# Patient Record
Sex: MALE | Race: WHITE | NOT HISPANIC OR LATINO | URBAN - METROPOLITAN AREA
[De-identification: names, ages, dates, MRNs, and addresses within clinical notes are randomized per-mention and may not be internally consistent; named-entity substitution may affect disease eponyms.]

---

## 2017-05-09 ENCOUNTER — COMMUNICATION - HEALTHEAST (OUTPATIENT)
Dept: FAMILY MEDICINE | Facility: CLINIC | Age: 42
End: 2017-05-09

## 2017-05-09 ENCOUNTER — OFFICE VISIT - HEALTHEAST (OUTPATIENT)
Dept: FAMILY MEDICINE | Facility: CLINIC | Age: 42
End: 2017-05-09

## 2017-05-09 ENCOUNTER — COMMUNICATION - HEALTHEAST (OUTPATIENT)
Dept: TELEHEALTH | Facility: CLINIC | Age: 42
End: 2017-05-09

## 2017-05-09 DIAGNOSIS — Z00.00 ANNUAL PHYSICAL EXAM: ICD-10-CM

## 2017-05-09 DIAGNOSIS — K21.9 ACID REFLUX: ICD-10-CM

## 2017-05-09 DIAGNOSIS — Z23 NEED FOR VACCINATION: ICD-10-CM

## 2017-05-09 LAB
CHOLEST SERPL-MCNC: 167 MG/DL
FASTING STATUS PATIENT QL REPORTED: YES
HDLC SERPL-MCNC: 36 MG/DL
LDLC SERPL CALC-MCNC: 106 MG/DL
TRIGL SERPL-MCNC: 125 MG/DL

## 2017-05-09 ASSESSMENT — MIFFLIN-ST. JEOR: SCORE: 1766.25

## 2017-05-10 ENCOUNTER — COMMUNICATION - HEALTHEAST (OUTPATIENT)
Dept: FAMILY MEDICINE | Facility: CLINIC | Age: 42
End: 2017-05-10

## 2019-01-22 ENCOUNTER — OFFICE VISIT - HEALTHEAST (OUTPATIENT)
Dept: FAMILY MEDICINE | Facility: CLINIC | Age: 44
End: 2019-01-22

## 2019-01-22 DIAGNOSIS — Z00.00 ANNUAL PHYSICAL EXAM: ICD-10-CM

## 2019-01-22 DIAGNOSIS — R42 LIGHTHEADEDNESS: ICD-10-CM

## 2019-01-22 DIAGNOSIS — L85.3 DRY SKIN: ICD-10-CM

## 2019-01-22 LAB
ALBUMIN SERPL-MCNC: 4 G/DL (ref 3.5–5)
ALP SERPL-CCNC: 75 U/L (ref 45–120)
ALT SERPL W P-5'-P-CCNC: 21 U/L (ref 0–45)
ANION GAP SERPL CALCULATED.3IONS-SCNC: 5 MMOL/L (ref 5–18)
AST SERPL W P-5'-P-CCNC: 20 U/L (ref 0–40)
ATRIAL RATE - MUSE: 0 BPM
BILIRUB SERPL-MCNC: 0.5 MG/DL (ref 0–1)
BUN SERPL-MCNC: 10 MG/DL (ref 8–22)
CALCIUM SERPL-MCNC: 9.2 MG/DL (ref 8.5–10.5)
CHLORIDE BLD-SCNC: 105 MMOL/L (ref 98–107)
CHOLEST SERPL-MCNC: 189 MG/DL
CO2 SERPL-SCNC: 30 MMOL/L (ref 22–31)
CREAT SERPL-MCNC: 1.16 MG/DL (ref 0.7–1.3)
DIASTOLIC BLOOD PRESSURE - MUSE: NORMAL MMHG
FASTING STATUS PATIENT QL REPORTED: YES
GFR SERPL CREATININE-BSD FRML MDRD: >60 ML/MIN/1.73M2
GLUCOSE BLD-MCNC: 88 MG/DL (ref 70–125)
HDLC SERPL-MCNC: 42 MG/DL
HGB BLD-MCNC: 16.6 G/DL (ref 14–18)
INTERPRETATION ECG - MUSE: NORMAL
LDLC SERPL CALC-MCNC: 118 MG/DL
P AXIS - MUSE: NORMAL DEGREES
POTASSIUM BLD-SCNC: 4.2 MMOL/L (ref 3.5–5)
PR INTERVAL - MUSE: NORMAL MS
PROT SERPL-MCNC: 6.8 G/DL (ref 6–8)
QRS DURATION - MUSE: 70 MS
QT - MUSE: 398 MS
QTC - MUSE: 387 MS
R AXIS - MUSE: 140 DEGREES
SODIUM SERPL-SCNC: 140 MMOL/L (ref 136–145)
SYSTOLIC BLOOD PRESSURE - MUSE: NORMAL MMHG
T AXIS - MUSE: -24 DEGREES
TRIGL SERPL-MCNC: 146 MG/DL
TSH SERPL DL<=0.005 MIU/L-ACNC: 1.62 UIU/ML (ref 0.3–5)
VENTRICULAR RATE- MUSE: 57 BPM

## 2019-01-22 ASSESSMENT — MIFFLIN-ST. JEOR: SCORE: 1766.88

## 2019-01-23 ENCOUNTER — COMMUNICATION - HEALTHEAST (OUTPATIENT)
Dept: FAMILY MEDICINE | Facility: CLINIC | Age: 44
End: 2019-01-23

## 2019-11-27 ENCOUNTER — APPOINTMENT (OUTPATIENT)
Age: 44
Setting detail: DERMATOLOGY
End: 2019-11-27

## 2019-11-27 VITALS — WEIGHT: 190 LBS | RESPIRATION RATE: 16 BRPM | HEIGHT: 68 IN

## 2019-11-27 DIAGNOSIS — Q80.0 ICHTHYOSIS VULGARIS: ICD-10-CM

## 2019-11-27 PROCEDURE — 99202 OFFICE O/P NEW SF 15 MIN: CPT

## 2019-11-27 PROCEDURE — OTHER COUNSELING: OTHER

## 2019-11-27 PROCEDURE — OTHER PRESCRIPTION: OTHER

## 2019-11-27 RX ORDER — TRIAMCINOLONE ACETONIDE 1 MG/G
BID CREAM TOPICAL BID
Qty: 1 | Refills: 1 | Status: ERX | COMMUNITY
Start: 2019-11-27

## 2019-11-27 ASSESSMENT — LOCATION SIMPLE DESCRIPTION DERM
LOCATION SIMPLE: LEFT PRETIBIAL REGION
LOCATION SIMPLE: RIGHT UPPER ARM
LOCATION SIMPLE: LEFT UPPER ARM
LOCATION SIMPLE: LEFT THIGH
LOCATION SIMPLE: LEFT POSTERIOR THIGH
LOCATION SIMPLE: RIGHT PRETIBIAL REGION
LOCATION SIMPLE: RIGHT FOREARM
LOCATION SIMPLE: RIGHT CALF
LOCATION SIMPLE: RIGHT THIGH
LOCATION SIMPLE: LEFT FOREARM
LOCATION SIMPLE: LEFT CALF
LOCATION SIMPLE: RIGHT POSTERIOR THIGH

## 2019-11-27 ASSESSMENT — LOCATION DETAILED DESCRIPTION DERM
LOCATION DETAILED: RIGHT DISTAL LATERAL POSTERIOR THIGH
LOCATION DETAILED: RIGHT ANTERIOR PROXIMAL THIGH
LOCATION DETAILED: LEFT PROXIMAL DORSAL FOREARM
LOCATION DETAILED: LEFT ANTERIOR PROXIMAL THIGH
LOCATION DETAILED: LEFT DISTAL POSTERIOR THIGH
LOCATION DETAILED: LEFT PROXIMAL PRETIBIAL REGION
LOCATION DETAILED: RIGHT DISTAL PRETIBIAL REGION
LOCATION DETAILED: RIGHT DISTAL CALF
LOCATION DETAILED: RIGHT ANTERIOR PROXIMAL UPPER ARM
LOCATION DETAILED: RIGHT PROXIMAL RADIAL DORSAL FOREARM
LOCATION DETAILED: LEFT ANTERIOR DISTAL UPPER ARM
LOCATION DETAILED: LEFT PROXIMAL POSTERIOR UPPER ARM
LOCATION DETAILED: LEFT DISTAL CALF
LOCATION DETAILED: RIGHT PROXIMAL POSTERIOR UPPER ARM

## 2019-11-27 ASSESSMENT — LOCATION ZONE DERM
LOCATION ZONE: ARM
LOCATION ZONE: LEG

## 2019-11-27 NOTE — PROCEDURE: COUNSELING
Patient Specific Counseling (Will Not Stick From Patient To Patient): Recommend moisturizing with Vaseline or aquaphor. If no improvements in 1 month, RTC for re-evaluation.\\n Recommend using triamcinolone three days a week Monday, Wednesday, and Friday, apply to skin once daily to the arms and legs. I counseled him an address side effects of steroid overuse including steroid acne and striae.
Detail Level: Detailed

## 2020-01-27 ENCOUNTER — OFFICE VISIT - HEALTHEAST (OUTPATIENT)
Dept: FAMILY MEDICINE | Facility: CLINIC | Age: 45
End: 2020-01-27

## 2020-01-27 ENCOUNTER — COMMUNICATION - HEALTHEAST (OUTPATIENT)
Dept: SCHEDULING | Facility: CLINIC | Age: 45
End: 2020-01-27

## 2020-01-27 DIAGNOSIS — J01.10 ACUTE NON-RECURRENT FRONTAL SINUSITIS: ICD-10-CM

## 2021-05-26 ENCOUNTER — RECORDS - HEALTHEAST (OUTPATIENT)
Dept: ADMINISTRATIVE | Facility: CLINIC | Age: 46
End: 2021-05-26

## 2021-05-30 ENCOUNTER — RECORDS - HEALTHEAST (OUTPATIENT)
Dept: ADMINISTRATIVE | Facility: CLINIC | Age: 46
End: 2021-05-30

## 2021-05-31 VITALS — HEIGHT: 68 IN | BODY MASS INDEX: 29.92 KG/M2 | WEIGHT: 197.4 LBS

## 2021-06-02 VITALS — HEIGHT: 69 IN | WEIGHT: 197.19 LBS | BODY MASS INDEX: 29.2 KG/M2

## 2021-06-04 VITALS
DIASTOLIC BLOOD PRESSURE: 97 MMHG | SYSTOLIC BLOOD PRESSURE: 159 MMHG | OXYGEN SATURATION: 98 % | HEART RATE: 56 BPM | BODY MASS INDEX: 29.74 KG/M2 | WEIGHT: 198.5 LBS

## 2021-06-05 NOTE — PROGRESS NOTES
Assessment:   Diagnosis: Acute sinusitis     Plan:      1. I recommend taking Augmentin 875 mg tablet by mouth twice a day for 7 days, nasal saline rinsing twice a day, and oral hydration.  2. The risks and benefits of my recommendations, as well as other treatment options were discussed with the patient today.   3. Return for follow-up as needed.     Subjective:       Clay is a 45 y.o. male who I was asked to see in consultation for evaluation of acute sinusitis. The patient reports acute sinus infection for 4 days.  His symptoms include nasal congestion, cough, headaches, facial pain and pressure.  There has not been a history of headaches and facial pain.         Review of Systems  Ears, nose, mouth, throat, and face: positive for nasal congestion       Objective:      BP (!) 159/97   Pulse (!) 56   Wt 198 lb 8 oz (90 kg)   SpO2 98%   BMI 29.74 kg/m      General:   healthy, alert, appears stated age, not in distress   Head and Face:   sinus tenderness was present   External Ears:   normal pinnae shape and position, no signs of inflammation   Ext. Aud. Canal:  Right:patent    Left: patent    Tympanic Mem:  Right: normal landmarks and mobility   Left: normal landmarks and mobility   Nose:  clear discharge, mild congestion, right turbinate red, swollen   Oropharynx:   bifid uvula, lips, dentition and gingiva within normal for age, normal tongue movement   Tonsils:   normal size, normal appearance   Post. Pharynx:   normal mucosa   Neck:   no asymmetry, masses, or scars, supple without significant adenopathy, trachea midline, nontender, mobile lymph nodes < 1.5 cm jugulodigastric region   Thyroid:   Normal       STEFANO Warner-Student 1/27/2020

## 2021-06-05 NOTE — PROGRESS NOTES
Assessment:   1. Acute non-recurrent frontal sinusitis  Discussed diagnosis and treatment plan with patient. Recommend treatment with antibiotic and use of inhaled nasal steroid.   - amoxicillin-clavulanate (AUGMENTIN) 875-125 mg per tablet; Take 1 tablet by mouth 2 (two) times a day for 10 days.  Dispense: 20 tablet; Refill: 0    Addendum: Elevated blood pressure. Called and informed patient to return to the clinic in two weeks for blood pressure recheck and possible treatment.      Plan:   Nasal saline sprays.  Nasal steroids per medication orders.  Antihistamines per medication orders.  Augmentin per medication orders.  Follow up in 7 days or as needed.     Subjective:   Clay Hamilton is a 45 y.o. male who presents for evaluation of sinus pain. Symptoms include: clear rhinorrhea, congestion, facial pain, frequent clearing of the throat, headaches, post nasal drip, sinus pressure and tooth pain. Onset of symptoms was 7 days ago. Symptoms have been unchanged since that time. Past history is significant for no history of pneumonia or bronchitis. Patient is a non-smoker.    The following portions of the patient's history were reviewed and updated as appropriate: allergies, current medications, past family history, past medical history, past social history, past surgical history and problem list.    Review of Systems  A 12 point comprehensive review of systems was negative except as noted.     Objective:      BP (!) 159/97   Pulse (!) 56   Wt 198 lb 8 oz (90 kg)   SpO2 98%   BMI 29.74 kg/m    General appearance: alert, appears stated age and cooperative  Head: Normocephalic, without obvious abnormality, atraumatic, sinuses tender to percussion  Eyes: conjunctivae/corneas clear. PERRL, EOM's intact. Fundi benign.  Ears: normal TM's and external ear canals both ears  Nose: yellow discharge, moderate congestion, turbinates inflamed  Throat: lips, mucosa, and tongue normal; teeth and gums normal  Neck: no adenopathy,  no carotid bruit, no JVD, supple, symmetrical, trachea midline and thyroid not enlarged, symmetric, no tenderness/mass/nodules  Lungs: clear to auscultation bilaterally  Heart: regular rate and rhythm, S1, S2 normal, no murmur, click, rub or gallop  Pulses: 2+ and symmetric  Neurologic: Grossly normal

## 2021-06-05 NOTE — TELEPHONE ENCOUNTER
Triage call:     Sinus symptoms since Thursday  Pain in his Teeth and around his eyes    Symptoms have stayed the same- doesn't feel sick otherwise   Mild to moderate pain   Able to get mucous out- clear at this time   breathing through his nose still but uncomfortable   Irritated throat   Eyes are a little puffy   No fever     Triaged to be seen today or tomorrow- reviewed additional care advice with patient and he verbalizes understanding. Patient warm transferred to scheduling for appointment.  Appointment with PCP @ 10:30 am today.     Jeanna Kraus RN [facility] Triage/Med Refill 1/27/2020 8:10 AM      Reason for Disposition    Patient wants to be seen    Protocols used: SINUS PAIN AND CONGESTION-A-[place]

## 2021-06-10 NOTE — PROGRESS NOTES
Assessment:   1. Need for vaccination  Complete vaccination  - Tdap vaccine,  6yo or older,  IM    2. Annual physical exam  Healthy male exam  - Comprehensive Metabolic Panel  - Lipid Cascade  - Thyroid Stimulating Hormone (TSH)    3. Acid reflux  Nonpharmacologic treatments were discussed including: eating smaller meals, elevation of the head of bed at night, avoidance of caffeine, chocolate, nicotine and peppermint, and avoiding tight fitting clothing.  Will start a trial of H2 antagonists.  Follow up in 4 weeks or sooner as needed.   - ranitidine (ZANTAC) 150 MG tablet; Take 1 tablet (150 mg total) by mouth 2 (two) times a day.  Dispense: 30 tablet; Refill: 3     Plan:   All questions answered.  Blood tests: CBC with diff, Comprehensive metabolic panel and TSH.  Testicular self exam technique reviewed and patient encouraged to perform self-exam monthly.  Discussed healthy lifestyle modifications.  Follow up as needed.     Subjective:   Clay Hamilton is a 42 y.o. male who presents for an annual exam. The patient reports that there NO domestic violence in his life. 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 He would like to be evaluated for a possible acid reflux, patient states that he clears his throat a lot and people around him have gotten to notice that a lot. Hanna has been associated with belching and heartburn. He denies abdominal bloating, bilious reflux, chest pain, cough and difficulty swallowing. Symptoms have been present for a few months. He denies dysphagia. He has not lost weight. He denies melena, hematochezia, hematemesis, and coffee ground emesis. Medical therapy in the past has included: antacids.    Healthy Habits:   Regular Exercise: Yes  Sunscreen Use: Yes  Healthy Diet: No  Dental Visits Regularly: No  Seat Belt: Yes  Sexually active: Yes  Monthly Self Testicular Exams:  No  Hemoccults: N/A  Flex Sig: N/A  Colonoscopy: N/A  Lipid Profile: N/A  Glucose Screen: N/A  Prevention  "of Osteoporosis: N/A  Last Dexa: N/A  Guns at Home:  No      Immunization History   Administered Date(s) Administered     Td, historic 05/26/2000     Tdap 12/16/2008     Immunization status: up to date and documented, stated as current, but no records available.    No exam data present    No current outpatient prescriptions on file.     No current facility-administered medications for this visit.      No past medical history on file.  Past Surgical History:   Procedure Laterality Date     MT APPENDECTOMY      Description: Appendectomy;  Recorded: 07/05/2009;     Review of patient's allergies indicates no known allergies.  No family history on file.  Social History     Social History     Marital status: Single     Spouse name: N/A     Number of children: N/A     Years of education: N/A     Occupational History     Not on file.     Social History Main Topics     Smoking status: Former Smoker     Smokeless tobacco: Not on file     Alcohol use Not on file     Drug use: Not on file     Sexual activity: Not on file     Other Topics Concern     Not on file     Social History Narrative       Review of Systems  General:  Denies problem  Eyes: Denies problem  Ears/Nose/Throat: Denies problem  Cardiovascular: Denies problem  Respiratory:  Denies problem  Gastrointestinal:  Denies problem  Genitourinary: Denies problem  Musculoskeletal:  Denies problem  Skin: Denies problem  Neurologic: Denies problem  Psychiatric: Denies problem  Endocrine: Denies problem  Heme/Lymphatic: Denies problem   Allergic/Immunologic: Denies problem        Objective:     Vitals:    05/09/17 0915   BP: 130/72   Pulse: 61   SpO2: 97%   Weight: 197 lb 6.4 oz (89.5 kg)   Height: 5' 8.4\" (1.737 m)     Body mass index is 29.66 kg/(m^2).    Physical  General Appearance: Alert, cooperative, no distress, appears stated age  Head: Normocephalic, without obvious abnormality, atraumatic  Eyes: PERRL, conjunctiva/corneas clear, EOM's intact  Ears: Normal TM's and " external ear canals, both ears  Nose: Nares normal, septum midline,mucosa normal, no drainage  Throat: Lips, mucosa, and tongue normal; teeth and gums normal  Neck: Supple, symmetrical, trachea midline, no adenopathy;  thyroid: not enlarged, symmetric, no tenderness/mass/nodules; no carotid bruit or JVD  Back: Symmetric, no curvature, ROM normal, no CVA tenderness  Lungs: Clear to auscultation bilaterally, respirations unlabored  Heart: Regular rate and rhythm, S1 and S2 normal, no murmur, rub, or gallop,  Abdomen: Soft, non-tender, bowel sounds active all four quadrants,  no masses, no organomegaly  Genitourinary: Penis normal. Right testis is descended. Left testis is descended.   Musculoskeletal: Normal range of motion. No joint swelling or deformity.   Extremities: Extremities normal, atraumatic, no cyanosis or edema  Skin: Skin color, texture, turgor normal, no rashes or lesions  Lymph nodes: Cervical, supraclavicular, and axillary nodes normal  Neurologic: He is alert. He has normal reflexes.   Psychiatric: He has a normal mood and affect.

## 2021-06-18 NOTE — LETTER
Letter by Jeniffer Rosario FNP at      Author: Jeniffer Rosario FNP Service: -- Author Type: --    Filed:  Encounter Date: 1/23/2019 Status: (Other)       Clay Hamilton  [address]  [place] [address]             January 23, 2019         Dear Mr. Hamilton,    Below are the results from your recent visit:    Resulted Orders   Lipid Cascade   Result Value Ref Range    Cholesterol 189 <=199 mg/dL    Triglycerides 146 <=149 mg/dL    HDL Cholesterol 42 >=40 mg/dL    LDL Calculated 118 <=129 mg/dL    Patient Fasting > 8hrs? Yes    Comprehensive Metabolic Panel   Result Value Ref Range    Sodium 140 136 - 145 mmol/L    Potassium 4.2 3.5 - 5.0 mmol/L    Chloride 105 98 - 107 mmol/L    CO2 30 22 - 31 mmol/L    Anion Gap, Calculation 5 5 - 18 mmol/L    Glucose 88 70 - 125 mg/dL    BUN 10 8 - 22 mg/dL    Creatinine 1.16 0.70 - 1.30 mg/dL    GFR MDRD Af Amer >60 >60 mL/min/1.73m2    GFR MDRD Non Af Amer >60 >60 mL/min/1.73m2    Bilirubin, Total 0.5 0.0 - 1.0 mg/dL    Calcium 9.2 8.5 - 10.5 mg/dL    Protein, Total 6.8 6.0 - 8.0 g/dL    Albumin 4.0 3.5 - 5.0 g/dL    Alkaline Phosphatase 75 45 - 120 U/L    AST 20 0 - 40 U/L    ALT 21 0 - 45 U/L    Narrative    Fasting Glucose reference range is 70-99 mg/dL per  American Diabetes Association (ADA) guidelines.   Hemoglobin   Result Value Ref Range    Hemoglobin 16.6 14.0 - 18.0 g/dL   Thyroid Stimulating Hormone (TSH)   Result Value Ref Range    TSH 1.62 0.30 - 5.00 uIU/mL       Normal lab results. Return to the clinic if symptom persist.     Please call with questions or contact us using IPexpertt.    Sincerely,        Electronically signed by STEFANO Ellsworth

## 2021-06-23 NOTE — PROGRESS NOTES
MALE PREVENTATIVE EXAM    Assessment and Plan:   1. Annual physical exam  Healthy male exam  - Lipid Cascade  - Comprehensive Metabolic Panel  - Hemoglobin    2. Lightheadedness  I suspect near syncope due to vasovagal.  - Hemoglobin  - Thyroid Stimulating Hormone (TSH)  - Electrocardiogram Perform and Read    Next follow up:  No Follow-up on file.    Immunization Review  Adult Imm Review: No immunizations due today    I discussed the following with the patient:   Adult Healthy Living: Importance of regular exercise  Healthy nutrition  Getting adequate sleep  Stress management  Use of seat belts  Distracted driving    I have had an Advance Directives discussion with the patient.    Subjective:   Chief Complaint: Clay Hamilton is an 44 y.o. male here for a preventative health visit.     HPI:  Patient reports that for almost three months he has been experiencing lightheadedness at least once a week. He reports that he started working out three to four times weekly 4 months ago. He reports that lightheadedness happens mostly during mid morning and he works out in the morning. He also reports that he does not eat breakfast but one protein bar. There is no particular position that makes the symptom worse or better. He denied any recent illness or history of brain injury or cardiac disease.     Healthy Habits  Are you taking a daily aspirin? No  Do you typically exercising at least 40 min, 3-4 times per week?  Yes  Do you usually eat at least 4 servings of fruit and vegetables a day, include whole grains and fiber and avoid regularly eating high fat foods? Yes  Have you had an eye exam in the past two years? Yes  Do you see a dentist twice per year? NO  Do you have any concerns regarding sleep? YES    Safety Screen  If you own firearms, are they secured in a locked gun cabinet or with trigger locks? The patient does not own any firearms  Do you feel you are safe where you are living?: Yes (1/22/2019  8:57 AM)  Do you  "feel you are safe in your relationship(s)?: Yes (1/22/2019  8:57 AM)      Review of Systems:  Please see above.  The rest of the review of systems are negative for all systems.     Cancer Screening     Patient has no health maintenance due at this time          Patient Care Team:  Jeniffer Rosario FNP as PCP - General (Nurse Practitioner)        History     Not marked as reviewed during this visit.            Objective:   Vital Signs: There were no vitals taken for this visit.       PHYSICAL EXAM  /78 (Patient Site: Right Arm, Patient Position: Sitting, Cuff Size: Adult Regular)   Pulse 64   Ht 5' 8.5\" (1.74 m)   Wt 197 lb 3 oz (89.4 kg)   SpO2 97%   BMI 29.55 kg/m    General appearance: alert, appears stated age and cooperative  Head: Normocephalic, without obvious abnormality, atraumatic  Eyes: conjunctivae/corneas clear. PERRL, EOM's intact. Fundi benign.  Ears: normal TM's and external ear canals both ears  Throat: lips, mucosa, and tongue normal; teeth and gums normal  Neck: no adenopathy, no carotid bruit, no JVD, supple, symmetrical, trachea midline and thyroid not enlarged, symmetric, no tenderness/mass/nodules  Back: symmetric, no curvature. ROM normal. No CVA tenderness.  Lungs: clear to auscultation bilaterally  Chest wall: no tenderness  Heart: regular rate and rhythm, S1, S2 normal, no murmur, click, rub or gallop  Abdomen: soft, non-tender; bowel sounds normal; no masses,  no organomegaly  Extremities: extremities normal, atraumatic, no cyanosis or edema  Pulses: 2+ and symmetric  Skin: Skin color, texture, turgor normal. No rashes or lesions or papular - generalized, and most on his lower extremities.  and torsol  Lymph nodes: Cervical, supraclavicular, and axillary nodes normal.  Neurologic: Grossly normal      The 10-year ASCVD risk score (James DC Jr., et al., 2013) is: 1.3%    Values used to calculate the score:      Age: 44 years      Sex: Male      Is Non- : " No      Diabetic: No      Tobacco smoker: No      Systolic Blood Pressure: 112 mmHg      Is BP treated: No      HDL Cholesterol: 36 mg/dL      Total Cholesterol: 167 mg/dL         Medication List      as of 1/22/2019  9:45 AM     You have not been prescribed any medications.         Additional Screenings Completed Today:

## 2021-06-26 ENCOUNTER — HEALTH MAINTENANCE LETTER (OUTPATIENT)
Age: 46
End: 2021-06-26

## 2021-10-16 ENCOUNTER — HEALTH MAINTENANCE LETTER (OUTPATIENT)
Age: 46
End: 2021-10-16

## 2022-07-23 ENCOUNTER — HEALTH MAINTENANCE LETTER (OUTPATIENT)
Age: 47
End: 2022-07-23

## 2022-08-16 NOTE — HPI: DRY SKIN
How Severe Is Your Dry Skin?: moderate
How Many Showers Or Baths Do You Take In One Day?: 1 every other day
Graft Donor Site Bandage (Optional-Leave Blank If You Don't Want In Note): Steri-strips and a pressure bandage were applied to the donor site.

## 2022-10-01 ENCOUNTER — HEALTH MAINTENANCE LETTER (OUTPATIENT)
Age: 47
End: 2022-10-01

## 2023-08-06 ENCOUNTER — HEALTH MAINTENANCE LETTER (OUTPATIENT)
Age: 48
End: 2023-08-06

## 2024-12-19 ENCOUNTER — HOSPITAL ENCOUNTER (OUTPATIENT)
Facility: CLINIC | Age: 49
Setting detail: OBSERVATION
Discharge: HOME | End: 2024-12-19
Attending: EMERGENCY MEDICINE
Payer: COMMERCIAL

## 2024-12-19 ENCOUNTER — APPOINTMENT (OUTPATIENT)
Dept: CT IMAGING | Facility: CLINIC | Age: 49
End: 2024-12-19
Attending: EMERGENCY MEDICINE
Payer: COMMERCIAL

## 2024-12-19 VITALS
SYSTOLIC BLOOD PRESSURE: 165 MMHG | HEART RATE: 61 BPM | OXYGEN SATURATION: 97 % | RESPIRATION RATE: 18 BRPM | TEMPERATURE: 97.9 F | DIASTOLIC BLOOD PRESSURE: 92 MMHG | BODY MASS INDEX: 28.49 KG/M2 | HEIGHT: 68 IN | WEIGHT: 188 LBS

## 2024-12-19 DIAGNOSIS — I47.10 SVT (SUPRAVENTRICULAR TACHYCARDIA) (H): ICD-10-CM

## 2024-12-19 LAB
ALBUMIN SERPL BCG-MCNC: 4.7 G/DL (ref 3.5–5.2)
ALP SERPL-CCNC: 88 U/L (ref 40–150)
ALT SERPL W P-5'-P-CCNC: 27 U/L (ref 0–70)
ANION GAP SERPL CALCULATED.3IONS-SCNC: 17 MMOL/L (ref 7–15)
AST SERPL W P-5'-P-CCNC: 38 U/L (ref 0–45)
ATRIAL RATE - MUSE: 153 BPM
ATRIAL RATE - MUSE: 77 BPM
BASOPHILS # BLD AUTO: 0.1 10E3/UL (ref 0–0.2)
BASOPHILS NFR BLD AUTO: 1 %
BILIRUB SERPL-MCNC: 0.7 MG/DL
BUN SERPL-MCNC: 20.7 MG/DL (ref 6–20)
CALCIUM SERPL-MCNC: 10.1 MG/DL (ref 8.8–10.4)
CHLORIDE SERPL-SCNC: 102 MMOL/L (ref 98–107)
CREAT SERPL-MCNC: 1.3 MG/DL (ref 0.67–1.17)
D DIMER PPP FEU-MCNC: 0.51 UG/ML FEU (ref 0–0.5)
DIASTOLIC BLOOD PRESSURE - MUSE: 97 MMHG
DIASTOLIC BLOOD PRESSURE - MUSE: NORMAL MMHG
EGFRCR SERPLBLD CKD-EPI 2021: 68 ML/MIN/1.73M2
EOSINOPHIL # BLD AUTO: 0.3 10E3/UL (ref 0–0.7)
EOSINOPHIL NFR BLD AUTO: 3 %
ERYTHROCYTE [DISTWIDTH] IN BLOOD BY AUTOMATED COUNT: 12.8 % (ref 10–15)
GLUCOSE SERPL-MCNC: 95 MG/DL (ref 70–99)
HCO3 SERPL-SCNC: 20 MMOL/L (ref 22–29)
HCT VFR BLD AUTO: 47.2 % (ref 40–53)
HGB BLD-MCNC: 16.9 G/DL (ref 13.3–17.7)
IMM GRANULOCYTES # BLD: 0 10E3/UL
IMM GRANULOCYTES NFR BLD: 0 %
INTERPRETATION ECG - MUSE: NORMAL
INTERPRETATION ECG - MUSE: NORMAL
LYMPHOCYTES # BLD AUTO: 3.4 10E3/UL (ref 0.8–5.3)
LYMPHOCYTES NFR BLD AUTO: 40 %
MAGNESIUM SERPL-MCNC: 2.1 MG/DL (ref 1.7–2.3)
MCH RBC QN AUTO: 30.3 PG (ref 26.5–33)
MCHC RBC AUTO-ENTMCNC: 35.8 G/DL (ref 31.5–36.5)
MCV RBC AUTO: 85 FL (ref 78–100)
MONOCYTES # BLD AUTO: 0.8 10E3/UL (ref 0–1.3)
MONOCYTES NFR BLD AUTO: 9 %
NEUTROPHILS # BLD AUTO: 3.9 10E3/UL (ref 1.6–8.3)
NEUTROPHILS NFR BLD AUTO: 46 %
NRBC # BLD AUTO: 0 10E3/UL
NRBC BLD AUTO-RTO: 0 /100
P AXIS - MUSE: 51 DEGREES
P AXIS - MUSE: NORMAL DEGREES
PLATELET # BLD AUTO: 219 10E3/UL (ref 150–450)
POTASSIUM SERPL-SCNC: 3.7 MMOL/L (ref 3.4–5.3)
PR INTERVAL - MUSE: 184 MS
PR INTERVAL - MUSE: NORMAL MS
PROT SERPL-MCNC: 7.2 G/DL (ref 6.4–8.3)
QRS DURATION - MUSE: 72 MS
QRS DURATION - MUSE: 78 MS
QT - MUSE: 294 MS
QT - MUSE: 358 MS
QTC - MUSE: 405 MS
QTC - MUSE: 447 MS
R AXIS - MUSE: 78 DEGREES
R AXIS - MUSE: 86 DEGREES
RBC # BLD AUTO: 5.57 10E6/UL (ref 4.4–5.9)
SODIUM SERPL-SCNC: 139 MMOL/L (ref 135–145)
SYSTOLIC BLOOD PRESSURE - MUSE: 162 MMHG
SYSTOLIC BLOOD PRESSURE - MUSE: NORMAL MMHG
T AXIS - MUSE: -34 DEGREES
T AXIS - MUSE: 11 DEGREES
T4 FREE SERPL-MCNC: 1.39 NG/DL (ref 0.9–1.7)
TROPONIN T SERPL HS-MCNC: 21 NG/L
TROPONIN T SERPL HS-MCNC: 28 NG/L
TROPONIN T SERPL HS-MCNC: 9 NG/L
TSH SERPL DL<=0.005 MIU/L-ACNC: 7.54 UIU/ML (ref 0.3–4.2)
VENTRICULAR RATE- MUSE: 139 BPM
VENTRICULAR RATE- MUSE: 77 BPM
WBC # BLD AUTO: 8.4 10E3/UL (ref 4–11)

## 2024-12-19 PROCEDURE — 84484 ASSAY OF TROPONIN QUANT: CPT | Performed by: EMERGENCY MEDICINE

## 2024-12-19 PROCEDURE — 96361 HYDRATE IV INFUSION ADD-ON: CPT

## 2024-12-19 PROCEDURE — 84439 ASSAY OF FREE THYROXINE: CPT | Performed by: EMERGENCY MEDICINE

## 2024-12-19 PROCEDURE — 250N000013 HC RX MED GY IP 250 OP 250 PS 637: Performed by: INTERNAL MEDICINE

## 2024-12-19 PROCEDURE — 85379 FIBRIN DEGRADATION QUANT: CPT | Performed by: EMERGENCY MEDICINE

## 2024-12-19 PROCEDURE — G0378 HOSPITAL OBSERVATION PER HR: HCPCS

## 2024-12-19 PROCEDURE — 71275 CT ANGIOGRAPHY CHEST: CPT

## 2024-12-19 PROCEDURE — 84443 ASSAY THYROID STIM HORMONE: CPT | Performed by: EMERGENCY MEDICINE

## 2024-12-19 PROCEDURE — 83735 ASSAY OF MAGNESIUM: CPT | Performed by: EMERGENCY MEDICINE

## 2024-12-19 PROCEDURE — 99223 1ST HOSP IP/OBS HIGH 75: CPT | Performed by: INTERNAL MEDICINE

## 2024-12-19 PROCEDURE — 85025 COMPLETE CBC W/AUTO DIFF WBC: CPT | Performed by: EMERGENCY MEDICINE

## 2024-12-19 PROCEDURE — 80053 COMPREHEN METABOLIC PANEL: CPT | Performed by: EMERGENCY MEDICINE

## 2024-12-19 PROCEDURE — 36415 COLL VENOUS BLD VENIPUNCTURE: CPT | Performed by: EMERGENCY MEDICINE

## 2024-12-19 PROCEDURE — 93005 ELECTROCARDIOGRAM TRACING: CPT | Performed by: EMERGENCY MEDICINE

## 2024-12-19 PROCEDURE — 258N000003 HC RX IP 258 OP 636: Performed by: EMERGENCY MEDICINE

## 2024-12-19 PROCEDURE — 99285 EMERGENCY DEPT VISIT HI MDM: CPT | Mod: 25

## 2024-12-19 PROCEDURE — 96360 HYDRATION IV INFUSION INIT: CPT

## 2024-12-19 PROCEDURE — 99204 OFFICE O/P NEW MOD 45 MIN: CPT | Performed by: INTERNAL MEDICINE

## 2024-12-19 PROCEDURE — 250N000011 HC RX IP 250 OP 636: Performed by: EMERGENCY MEDICINE

## 2024-12-19 PROCEDURE — 99207 PR NO BILLABLE SERVICE THIS VISIT: CPT | Performed by: INTERNAL MEDICINE

## 2024-12-19 RX ORDER — HYDROCORTISONE 25 MG/G
CREAM TOPICAL DAILY
COMMUNITY

## 2024-12-19 RX ORDER — ALPRAZOLAM 0.5 MG
0.5 TABLET ORAL DAILY PRN
COMMUNITY

## 2024-12-19 RX ORDER — METOPROLOL SUCCINATE 25 MG/1
25 TABLET, EXTENDED RELEASE ORAL DAILY
Qty: 30 TABLET | Refills: 0 | Status: SHIPPED | OUTPATIENT
Start: 2024-12-19 | End: 2025-01-18

## 2024-12-19 RX ORDER — METOPROLOL SUCCINATE 25 MG/1
25 TABLET, EXTENDED RELEASE ORAL DAILY
Status: DISCONTINUED | OUTPATIENT
Start: 2024-12-19 | End: 2024-12-19 | Stop reason: HOSPADM

## 2024-12-19 RX ORDER — IOPAMIDOL 755 MG/ML
75 INJECTION, SOLUTION INTRAVASCULAR ONCE
Status: COMPLETED | OUTPATIENT
Start: 2024-12-19 | End: 2024-12-19

## 2024-12-19 RX ADMIN — IOPAMIDOL 75 ML: 755 INJECTION, SOLUTION INTRAVENOUS at 02:36

## 2024-12-19 RX ADMIN — SODIUM CHLORIDE 1000 ML: 9 INJECTION, SOLUTION INTRAVENOUS at 00:46

## 2024-12-19 RX ADMIN — METOPROLOL SUCCINATE 25 MG: 25 TABLET, EXTENDED RELEASE ORAL at 09:06

## 2024-12-19 ASSESSMENT — COLUMBIA-SUICIDE SEVERITY RATING SCALE - C-SSRS
6. HAVE YOU EVER DONE ANYTHING, STARTED TO DO ANYTHING, OR PREPARED TO DO ANYTHING TO END YOUR LIFE?: NO
2. HAVE YOU ACTUALLY HAD ANY THOUGHTS OF KILLING YOURSELF IN THE PAST MONTH?: NO
1. IN THE PAST MONTH, HAVE YOU WISHED YOU WERE DEAD OR WISHED YOU COULD GO TO SLEEP AND NOT WAKE UP?: NO

## 2024-12-19 ASSESSMENT — ACTIVITIES OF DAILY LIVING (ADL)
ADLS_ACUITY_SCORE: 41

## 2024-12-19 NOTE — CONSULTS
[facility]  [phone]          Assessment/Recommendations   Patient has had several weeks of intermittent racing heartbeats.  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  Episode while driving home from working out where his heart rate would not come down and came to the emergency room.  He reports that on his watch his heart rate was getting up into the 1 60-1 75 range and first EKG in the ER was 139 with SVT.  Had some ST-T wave changes which resolved when he converted to sinus rhythm.    He does drink energy drinks and had a full energy drink yesterday which was unusual for him.  I have advised him to discontinue energy drinks.  He does have borderline high blood pressure in the past and we will start him on Toprol 25 mg a day, schedule a stress echocardiogram, which will allow us to evaluate his heart rate and rhythm with exercise.    Will also schedule him for a evaluation with our electrophysiologist as he may well benefit from SVT ablation.    Okay for patient to discharge today.  Does not need a resting echocardiogram today as he will get a stress echocardiogram in the near future.       History of Present Illness/Subjective    Mr. Clay Hamilton is a 49 year old male with several week history of intermittent feeling his heart racing.  He has related this to panic attacks and sometimes they can occur with exercise or while resting after exercise and have occurred while he is driving a race car.  He drives racecar for an occupation.    The episodes typically come on fairly abruptly and his heart rate can jump up into the 1 60-1 90 range and then will go away rather abruptly.  Yesterday he was working out late into the evening and noticed that his heart rate was not coming down and when he was resting it would come down to 110 and then jump up to 160.  He has an Apple Watch  "and can also feel it.  He relates is feeling to panic attacks.  He tried to rest and closed up his workout place and was in the car and his heart rate just would not come down.  He was in the 160 range.  He was driving by the [employer] so he is decided to pull in.  When he walked into the waiting room he saw along the line that his heart rate was down to 110 so he went back to his car but then the heart rate shot back up and he came into the ER where first EKG showed narrow complex tachycardia at 139 bpm.  The tachycardia resolved shortly after he got to the emergency department and he was kept through the night.  Troponin was mildly elevated on the second check going from 21-28.  The patient had a little bit of shortness of breath with the tachycardia but denies any chest discomfort to me.  He felt a little lightheaded but was not syncopal or near syncopal.  He has had the same episodes which she relates to \"panic attacks\".    He has had a little bit of elevation in his blood pressure and he and his primary care provider of talked about the possibility of blood pressure medicines.  He does use caffeine and usually drinks 1 energy drink in 2 or 3 days but yesterday had a full energy drink which is unusual.  He has an alcoholic beverage in the evening for dinner and onset the weekends, he can go out and have 4-6 drinks but this is not every weekend.  He used to smoke but switched to vaping but now has quit vaping.  His LDL cholesterol in 2019 was 118.  He states that he just had blood work done recently with his primary care provider.  His father has hypertension but he does not have a family history of premature coronary artery disease, is not diabetic, and has not been treated for hypertension.    He denies orthopnea, paroxysmal nocturnal dyspnea, peripheral edema, syncope with the exception of occasionally during medical test or blood draws.  He has passed out during these episodes.  He has no history of " "rheumatic fever, heart murmur, cerebrovascular accident or TIA.    He is  and drives a racecar for an occupation.  This is a stressful time a year as they have a break in the racing season but need to [employer] and plan out their season which ends up being the most stressful part of the year for him.    ECG: Personally reviewed.  First ECG shows SVT at 139 bpm with inferior lateral ST changes.  Second EKG shows sinus rhythm at 77 bpm and the second EKG is normal.    CT of the chest did not show any coronary artery calcifications.       Physical Examination Review of Systems   BP (!) 140/81   Pulse 55   Temp 98.6  F (37  C) (Temporal)   Resp 15   Ht 1.727 m (5' 8\")   Wt 85.3 kg (188 lb)   SpO2 96%   BMI 28.59 kg/m    Body mass index is 28.59 kg/m .  Wt Readings from Last 3 Encounters:   12/19/24 85.3 kg (188 lb)   01/27/20 90 kg (198 lb 8 oz)   01/22/19 89.4 kg (197 lb 3 oz)     General Appearance:   Alert, cooperative and in no acute distress.   ENT/Mouth: Pink/moist oral mucosa   EYES:  no scleral icterus, normal conjunctivae   Neck: JVP normal. No Hepatojugular reflux. Thyroid not visualized.   Chest/Lungs:   Lungs are clear to auscultation, equal chest wall expansion.   Cardiovascular:   S1, S2 without murmur ,clicks or rubs. Brachial, radial and posterior tibial pulses are intact and symetric. No carotid bruits noted   Abdomen:  Nontender. BS+.    Extremities: No cyanosis, clubbing or edema   Skin: no xanthelasma, warm.    Neurologic: normal arm movement bilateral, no tremors     Psychiatric: Appropriate affect.      Encounter Vitals  BP: (!) 140/81  Pulse: 55  Resp: 15  Temp: 98.6  F (37  C)  Temp src: Temporal  SpO2: 96 %  Weight: 85.3 kg (188 lb)  Height: 172.7 cm (5' 8\")                                           Medical History  Surgical History Family History Social History   No past medical history on file. Past Surgical History:   Procedure Laterality Date    APPENDECTOMY      SKIN " GRAFT      ZZC APPENDECTOMY      Description: Appendectomy;  Recorded: 07/05/2009;    Family History   Problem Relation Age of Onset    No Known Problems Mother     No Known Problems Father     No Known Problems Daughter     No Known Problems Son     Social History     Socioeconomic History    Marital status: Single     Spouse name: Not on file    Number of children: Not on file    Years of education: Not on file    Highest education level: Not on file   Occupational History    Not on file   Tobacco Use    Smoking status: Former    Smokeless tobacco: Never   Substance and Sexual Activity    Alcohol use: Yes     Alcohol/week: 3.0 standard drinks of alcohol     Comment: Alcoholic Drinks/day: oc    Drug use: No    Sexual activity: Yes     Partners: Female   Other Topics Concern    Not on file   Social History Narrative    Not on file     Social Drivers of Health     Financial Resource Strain: Not on file   Food Insecurity: Not on file   Transportation Needs: Not on file   Physical Activity: Not on file   Stress: Not on file   Social Connections: Not on file   Interpersonal Safety: Not on file   Housing Stability: Not on file          Medications  Allergies   Current Outpatient Medications   Medication Sig Dispense Refill    ALPRAZolam (XANAX) 0.5 MG tablet Take 0.5 mg by mouth daily as needed for anxiety.      hydrocortisone 2.5 % cream Apply topically daily. To lower extremities after showering      No Known Allergies      Lab Results    Chemistry/lipid CBC Cardiac Enzymes/BNP/TSH/INR   Lab Results   Component Value Date    CHOL 189 01/22/2019    HDL 42 01/22/2019    TRIG 146 01/22/2019    BUN 20.7 (H) 12/19/2024     12/19/2024    CO2 20 (L) 12/19/2024    Lab Results   Component Value Date    WBC 8.4 12/19/2024    HGB 16.9 12/19/2024    HCT 47.2 12/19/2024    MCV 85 12/19/2024     12/19/2024    Lab Results   Component Value Date    TSH 7.54 (H) 12/19/2024

## 2024-12-19 NOTE — H&P
"ADMISSION HISTORY & PHYSICAL      Jeniffer Rosario, [phone]  ASSESSMENT AND PLAN:  49 year old male with history of anxiety who presents with nonsustained SVT.      Nonsustained supraventricular tachycardia  Appreciate cardiology [id number] outpatient for stress testing.  Recommending metoprolol succinate 25 mg daily    Mild troponin elevation  Likely secondary to above  Stress testing outpatient    Disposition:  -Anticipated Length of Stay in midnights and medical necessity (including a midnight in the Emergency Department after triage if applicable): 1 night  -Discharge barriers: Cardiology recommendations    Clinically Significant Risk Factors Present on Admission                             # Overweight: Estimated body mass index is 28.59 kg/m  as calculated from the following:    Height as of this encounter: 1.727 m (5' 8\").    Weight as of this encounter: 85.3 kg (188 lb).                Medically Ready for Discharge: Ready Now        CHIEF COMPLAINT:  Lightheadedness    HISTORY OF PRESENTING ILLNESS:  Patient is a 49 year old male with history significant for anxiety who presented with SVT.  Patient notes that intermittently over the last few days he has been experiencing lightheadedness and shortness of breath and palpitations.  He notes these episodes come on sometimes when he is not doing anything.  Last night he was running and working out.  He noted that his heart rate had recovered but then suddenly was 190 bpm after rest.  He was concerned and presented to the emergency department.  No chest pain.  Complained of lightheadedness and palpitations.  Resolved on its own.    PMH/PSH:  Patient Active Problem List   Diagnosis    Anxiety    Pain During Urination (Dysuria)    SVT (supraventricular tachycardia) (H)       ALLERGIES:  No Known Allergies    MEDICATIONS:  Reviewed.  Current Facility-Administered Medications   Medication Dose Route Frequency Provider Last Rate Last Admin    " "metoprolol succinate ER (TOPROL XL) 24 hr tablet 25 mg  25 mg Oral Daily Reba Cochran, DO         Current Outpatient Medications   Medication Sig Dispense Refill    ALPRAZolam (XANAX) 0.5 MG tablet Take 0.5 mg by mouth daily as needed for anxiety.      hydrocortisone 2.5 % cream Apply topically daily. To lower extremities after showering      metoprolol succinate ER (TOPROL XL) 25 MG 24 hr tablet Take 1 tablet (25 mg) by mouth daily. 30 tablet 0       SOCIAL HISTORY:  Social History     Socioeconomic History    Marital status: Single     Spouse name: Not on file    Number of children: Not on file    Years of education: Not on file    Highest education level: Not on file   Occupational History    Not on file   Tobacco Use    Smoking status: Former    Smokeless tobacco: Never   Substance and Sexual Activity    Alcohol use: Yes     Alcohol/week: 3.0 standard drinks of alcohol     Comment: Alcoholic Drinks/day: oc    Drug use: No    Sexual activity: Yes     Partners: Female   Other Topics Concern    Not on file   Social History Narrative    Not on file     Social Drivers of Health     Financial Resource Strain: Not on file   Food Insecurity: Not on file   Transportation Needs: Not on file   Physical Activity: Not on file   Stress: Not on file   Social Connections: Not on file   Interpersonal Safety: Not on file   Housing Stability: Not on file       FAMILY HISTORY:  Family History   Problem Relation Age of Onset    No Known Problems Mother     No Known Problems Father     No Known Problems Daughter     No Known Problems Son           ROS:  12 point review of systems is reviewed and is negative except for what has already been mention in the history of presenting illness.     PHYSICAL EXAM:  BP (!) 140/81   Pulse 55   Temp 98.6  F (37  C) (Temporal)   Resp 15   Ht 1.727 m (5' 8\")   Wt 85.3 kg (188 lb)   SpO2 96%   BMI 28.59 kg/m    No intake/output data recorded.  No intake/output data " recorded.  GENRL: Alert and answering questions appropriately. Not in acute distress. Lying in bed   CHEST: Breathing easily   HEART: Regular rate   EXTRM: No pedal edema  NEURO: No focal neurological deficit. No involuntary movements. Normal mentation  PSYCH: pleasant affect and mood.   INTGM: No skin rash    Medical Decision Making       75 MINUTES SPENT BY ME on the date of service doing chart review, history, exam, documentation & further activities per the note.        DIAGNOSTIC DATA:  Recent Results (from the past 24 hours)   ECG 12-LEAD WITH MUSE ([facility])    Collection Time: 12/19/24 12:38 AM   Result Value Ref Range    Systolic Blood Pressure  mmHg    Diastolic Blood Pressure  mmHg    Ventricular Rate 139 BPM    Atrial Rate 153 BPM    OK Interval  ms    QRS Duration 72 ms     ms    QTc 447 ms    P Axis  degrees    R AXIS 86 degrees    T Axis -34 degrees    Interpretation ECG       Supraventricular tachycardia  ST & T wave abnormality, consider inferior ischemia  ST & T wave abnormality, consider anterolateral ischemia  Abnormal ECG  When compared with ECG of 22-Jan-2019 09:55,  Vent. rate has increased by  82 bpm  Left posterior fascicular block is no longer Present  Criteria for Inferior infarct are no longer Present  ST now depressed in Anterior leads  T wave inversion now evident in Anterolateral leads  Confirmed by SEE ED PROVIDER NOTE FOR, ECG INTERPRETATION (4000),  JEROME MENDOZA ([id number]) on 12/19/2024 12:42:03 AM     D dimer quantitative    Collection Time: 12/19/24 12:44 AM   Result Value Ref Range    D-Dimer Quantitative 0.51 (H) 0.00 - 0.50 ug/mL FEU   Comprehensive metabolic panel    Collection Time: 12/19/24 12:44 AM   Result Value Ref Range    Sodium 139 135 - 145 mmol/L    Potassium 3.7 3.4 - 5.3 mmol/L    Carbon Dioxide (CO2) 20 (L) 22 - 29 mmol/L    Anion Gap 17 (H) 7 - 15 mmol/L    Urea Nitrogen 20.7 (H) 6.0 - 20.0 mg/dL    Creatinine 1.30 (H) 0.67 - 1.17 mg/dL    GFR Estimate 68  >60 mL/min/1.73m2    Calcium 10.1 8.8 - 10.4 mg/dL    Chloride 102 98 - 107 mmol/L    Glucose 95 70 - 99 mg/dL    Alkaline Phosphatase 88 40 - 150 U/L    AST 38 0 - 45 U/L    ALT 27 0 - 70 U/L    Protein Total 7.2 6.4 - 8.3 g/dL    Albumin 4.7 3.5 - 5.2 g/dL    Bilirubin Total 0.7 <=1.2 mg/dL   Troponin T, High Sensitivity    Collection Time: 12/19/24 12:44 AM   Result Value Ref Range    Troponin T, High Sensitivity 9 <=22 ng/L   TSH with free T4 reflex    Collection Time: 12/19/24 12:44 AM   Result Value Ref Range    TSH 7.54 (H) 0.30 - 4.20 uIU/mL   Magnesium    Collection Time: 12/19/24 12:44 AM   Result Value Ref Range    Magnesium 2.1 1.7 - 2.3 mg/dL   CBC with platelets and differential    Collection Time: 12/19/24 12:44 AM   Result Value Ref Range    WBC Count 8.4 4.0 - 11.0 10e3/uL    RBC Count 5.57 4.40 - 5.90 10e6/uL    Hemoglobin 16.9 13.3 - 17.7 g/dL    Hematocrit 47.2 40.0 - 53.0 %    MCV 85 78 - 100 fL    MCH 30.3 26.5 - 33.0 pg    MCHC 35.8 31.5 - 36.5 g/dL    RDW 12.8 10.0 - 15.0 %    Platelet Count 219 150 - 450 10e3/uL    % Neutrophils 46 %    % Lymphocytes 40 %    % Monocytes 9 %    % Eosinophils 3 %    % Basophils 1 %    % Immature Granulocytes 0 %    NRBCs per 100 WBC 0 <1 /100    Absolute Neutrophils 3.9 1.6 - 8.3 10e3/uL    Absolute Lymphocytes 3.4 0.8 - 5.3 10e3/uL    Absolute Monocytes 0.8 0.0 - 1.3 10e3/uL    Absolute Eosinophils 0.3 0.0 - 0.7 10e3/uL    Absolute Basophils 0.1 0.0 - 0.2 10e3/uL    Absolute Immature Granulocytes 0.0 <=0.4 10e3/uL    Absolute NRBCs 0.0 10e3/uL   T4 free    Collection Time: 12/19/24 12:44 AM   Result Value Ref Range    Free T4 1.39 0.90 - 1.70 ng/dL   ECG 12-LEAD WITH MUSE ([facility])    Collection Time: 12/19/24 12:57 AM   Result Value Ref Range    Systolic Blood Pressure 162 mmHg    Diastolic Blood Pressure 97 mmHg    Ventricular Rate 77 BPM    Atrial Rate 77 BPM    OK Interval 184 ms    QRS Duration 78 ms     ms    QTc 405 ms    P Axis 51 degrees    R AXIS  78 degrees    T Axis 11 degrees    Interpretation ECG       Sinus rhythm  Normal ECG  When compared with ECG of 19-Dec-2024 00:38,  Vent. rate has decreased by  62 bpm  T wave inversion less evident in Inferior leads  T wave inversion no longer evident in Anterolateral leads  Confirmed by SEE ED PROVIDER NOTE FOR, ECG INTERPRETATION (4000),  JEROME MENDOZA ([id number]) on 12/19/2024 1:36:20 AM     Troponin T, High Sensitivity    Collection Time: 12/19/24  2:59 AM   Result Value Ref Range    Troponin T, High Sensitivity 21 <=22 ng/L   Troponin T, High Sensitivity    Collection Time: 12/19/24  4:54 AM   Result Value Ref Range    Troponin T, High Sensitivity 28 (H) <=22 ng/L     All lab studies reviewed personally  Radiology report reviewed.      Reba Cochran DO  Hospitalist Service  [facility]

## 2024-12-19 NOTE — PHARMACY-ADMISSION MEDICATION HISTORY
Pharmacist Admission Medication History    Admission medication history is complete. The information provided in this note is only as accurate as the sources available at the time of the update.    Information Source(s): Patient via in-person    Pertinent Information:       Changes made to PTA medication list:  Added: None  Deleted: None  Changed: None    Allergies reviewed with patient and updates made in EHR: yes    Medication History Completed By: Chris Perry RPH 12/19/2024 7:17 AM    PTA Med List   Medication Sig Last Dose/Taking    ALPRAZolam (XANAX) 0.5 MG tablet Take 0.5 mg by mouth daily as needed for anxiety. Past Month    hydrocortisone 2.5 % cream Apply topically daily. To lower extremities after showering Past Week

## 2024-12-19 NOTE — ED TRIAGE NOTES
Patient arrives to the ER with c/o an elevated heart rate. Patient was out for a run doing sprints. After finishing, he noticed that his heart rate kept going higher, then dropping back down, causing some shortness of breath.      Triage Assessment (Adult)       Row Name 12/19/24 0037          Triage Assessment    Airway WDL WDL        Respiratory WDL    Respiratory WDL X;rhythm/pattern     Rhythm/Pattern, Respiratory shortness of breath        Skin Circulation/Temperature WDL    Skin Circulation/Temperature WDL WDL        Cardiac WDL    Cardiac WDL X  palpitations, elevated heart rate        Peripheral/Neurovascular WDL    Peripheral Neurovascular WDL WDL        Cognitive/Neuro/Behavioral WDL    Cognitive/Neuro/Behavioral WDL WDL

## 2024-12-20 ENCOUNTER — HOSPITAL ENCOUNTER (OUTPATIENT)
Dept: CARDIOLOGY | Facility: HOSPITAL | Age: 49
Discharge: HOME | End: 2024-12-20
Attending: INTERNAL MEDICINE | Admitting: INTERNAL MEDICINE
Payer: COMMERCIAL

## 2024-12-20 DIAGNOSIS — I47.10 SVT (SUPRAVENTRICULAR TACHYCARDIA) (H): ICD-10-CM

## 2024-12-20 LAB
CV STRESS CURRENT BP HE: NORMAL
CV STRESS CURRENT HR HE: 101
CV STRESS CURRENT HR HE: 104
CV STRESS CURRENT HR HE: 108
CV STRESS CURRENT HR HE: 114
CV STRESS CURRENT HR HE: 114
CV STRESS CURRENT HR HE: 124
CV STRESS CURRENT HR HE: 127
CV STRESS CURRENT HR HE: 127
CV STRESS CURRENT HR HE: 140
CV STRESS CURRENT HR HE: 140
CV STRESS CURRENT HR HE: 142
CV STRESS CURRENT HR HE: 57
CV STRESS CURRENT HR HE: 64
CV STRESS CURRENT HR HE: 78
CV STRESS CURRENT HR HE: 78
CV STRESS CURRENT HR HE: 80
CV STRESS CURRENT HR HE: 82
CV STRESS CURRENT HR HE: 82
CV STRESS CURRENT HR HE: 84
CV STRESS CURRENT HR HE: 85
CV STRESS CURRENT HR HE: 86
CV STRESS CURRENT HR HE: 86
CV STRESS CURRENT HR HE: 87
CV STRESS CURRENT HR HE: 88
CV STRESS CURRENT HR HE: 89
CV STRESS CURRENT HR HE: 91
CV STRESS CURRENT HR HE: 93
CV STRESS CURRENT HR HE: 96
CV STRESS CURRENT HR HE: 98
CV STRESS CURRENT HR HE: 99
CV STRESS DEVIATION TIME HE: NORMAL
CV STRESS ECHO PERCENT HR HE: NORMAL
CV STRESS EXERCISE STAGE HE: NORMAL
CV STRESS EXERCISE STAGE REACHED HE: NORMAL
CV STRESS FINAL RESTING BP HE: NORMAL
CV STRESS FINAL RESTING HR HE: 78
CV STRESS MAX HR HE: 145
CV STRESS MAX TREADMILL GRADE HE: 16
CV STRESS MAX TREADMILL SPEED HE: 4.2
CV STRESS PEAK DIA BP HE: NORMAL
CV STRESS PEAK SYS BP HE: NORMAL
CV STRESS PHASE HE: NORMAL
CV STRESS PROTOCOL HE: NORMAL
CV STRESS REASON STOPPED HE: NORMAL
CV STRESS RESTING PT POSITION HE: NORMAL
CV STRESS RESTING PT POSITION HE: NORMAL
CV STRESS ST DEVIATION AMOUNT HE: NORMAL
CV STRESS ST DEVIATION ELEVATION HE: NORMAL
CV STRESS ST EVELATION AMOUNT HE: NORMAL
CV STRESS SYMPTOMS HE: NORMAL
CV STRESS TEST TYPE HE: NORMAL
CV STRESS TOTAL STAGE TIME MIN 1 HE: NORMAL
STRESS ECHO BASELINE DIASTOLIC HE: 88
STRESS ECHO BASELINE HR: 57
STRESS ECHO BASELINE SYSTOLIC BP: 140
STRESS ECHO LAST STRESS DIASTOLIC BP: 62
STRESS ECHO LAST STRESS HR: 140
STRESS ECHO LAST STRESS SYSTOLIC BP: 164
STRESS ECHO POST ESTIMATED WORKLOAD: 12.1
STRESS ECHO POST EXERCISE DUR MIN: 11
STRESS ECHO POST EXERCISE DUR SEC: 59
STRESS ECHO TARGET HR: 147

## 2024-12-20 PROCEDURE — 93325 DOPPLER ECHO COLOR FLOW MAPG: CPT | Mod: TC

## 2024-12-20 PROCEDURE — 93350 STRESS TTE ONLY: CPT | Mod: 26 | Performed by: INTERNAL MEDICINE

## 2024-12-20 PROCEDURE — 93016 CV STRESS TEST SUPVJ ONLY: CPT | Performed by: INTERNAL MEDICINE

## 2024-12-20 PROCEDURE — 93018 CV STRESS TEST I&R ONLY: CPT | Performed by: INTERNAL MEDICINE

## 2024-12-20 PROCEDURE — 93325 DOPPLER ECHO COLOR FLOW MAPG: CPT | Mod: 26 | Performed by: INTERNAL MEDICINE

## 2024-12-20 PROCEDURE — 93321 DOPPLER ECHO F-UP/LMTD STD: CPT | Mod: 26 | Performed by: INTERNAL MEDICINE

## 2024-12-20 PROCEDURE — 93350 STRESS TTE ONLY: CPT | Mod: TC

## 2024-12-23 DIAGNOSIS — I47.10 SVT (SUPRAVENTRICULAR TACHYCARDIA) (H): ICD-10-CM

## 2024-12-23 DIAGNOSIS — R06.09 DOE (DYSPNEA ON EXERTION): Primary | ICD-10-CM

## 2024-12-23 DIAGNOSIS — R07.89 CHEST DISCOMFORT: ICD-10-CM

## 2024-12-23 DIAGNOSIS — R00.2 PALPITATIONS: ICD-10-CM

## 2024-12-26 RX ORDER — METOPROLOL TARTRATE 1 MG/ML
5-20 INJECTION, SOLUTION INTRAVENOUS
Status: ACTIVE | OUTPATIENT
Start: 2024-12-26

## 2024-12-26 RX ORDER — NITROGLYCERIN 0.4 MG/1
0.4 TABLET SUBLINGUAL
Status: ACTIVE | OUTPATIENT
Start: 2024-12-26

## 2025-01-02 ENCOUNTER — OFFICE VISIT (OUTPATIENT)
Dept: CARDIOLOGY | Facility: CLINIC | Age: 50
End: 2025-01-02
Attending: INTERNAL MEDICINE
Payer: COMMERCIAL

## 2025-01-02 VITALS
WEIGHT: 192.4 LBS | HEIGHT: 68 IN | BODY MASS INDEX: 29.16 KG/M2 | DIASTOLIC BLOOD PRESSURE: 80 MMHG | HEART RATE: 55 BPM | SYSTOLIC BLOOD PRESSURE: 116 MMHG | RESPIRATION RATE: 16 BRPM | OXYGEN SATURATION: 96 %

## 2025-01-02 DIAGNOSIS — I47.10 SVT (SUPRAVENTRICULAR TACHYCARDIA) (H): ICD-10-CM

## 2025-01-02 NOTE — PATIENT INSTRUCTIONS
[facility]  Cardiac Electrophysiology  [address]  [place] [address]   Office: [phone]  Fax: [phone]       Thank you for seeing us in clinic today - it is a pleasure to be a part of your care team.  Below is a summary of our plan from today's visit.      We will call and schedule you for an an SVT ablation  Continue metoprolol for now    Please do not hesitate to be in touch with our office at [phone] with any questions that may arise.      Thank you for trusting us with your care,    Carole Shell MD  Clinical Cardiac Electrophysiology  [facility]  [address]  [place] [address]   Office: [phone]  Fax: [phone]

## 2025-01-02 NOTE — LETTER
1/2/2025    [facility] [address]  [facility], [place] [address]    RE: Clay Hamilton       Dear Colleague,     I had the pleasure of seeing Clay Hamilton in the [facility].    HEART CARE ENCOUNTER CONSULTATON NOTE      [facility]  [phone]      Assessment/Recommendations   Assessment/Plan:    Clay Hamilton is a very pleasant 49 year old male with PMH of SVT who presents today to the EP clinic.    SVT  - symptomatic with palpitations.  Likely AVNRT, though AVRT and AT are possible.  We reviewed SVT mechanisms and reviewed treatment options including electrophysiology study and ablation vs continued medical therapy.  We reviewed the nature of EP studies and ablation for SVT, success rates depending on the specific SVT mechanism, procedural risks (including groin hematoma, tamponade, heart block, stroke) and recovery expectations.  - He is on metoprolol and and would like to avoid long term meds      Time spent: 60 minutes spent on the date of the encounter doing chart review, history and exam, documentation and further activities as noted above.    The longitudinal plan of care for the condition(s) below were addressed during this visit. Due to the added complexity in care, I will continue support in the subsequent management of this condition(s) and with the ongoing continuity of care of this condition(s).          History of Present Illness/Subjective    HPI: Clay Hamilton is a very pleasant 49 year old male with PMH of SVT who presents today to the EP clinic.    Clay describes having symptoms of heart racing and palpitations after running a 5k and some sprints which prompted him to go to the ER in December. His HR was in the 140-160bpm range and it resolved spontaneously. He was started on Metoprolol after that. He has not had any episodes since starting metoprolol. However he would like to avoid long term meds.    He does  recall having episodes like this in the past but he attributed to having a panic attack      Recent Echocardiogram Results (personally reviewed):    Interpretation Summary  1. Normal stress echocardiogram without evidence of stress induced ischemia.  2. Normal resting LV systolic performance with an ejection fraction of 55-60%.  There is normal improvement in left ventricular systolic performance with a  peak ejection fraction of 70-75%.  3. No ECG evidence of ischemia.  4. Patient reported chest discomfort with exercise.  5. Good functional capacity for age.  6. Moderately frequent PVCs and 13 couplets were noted.      Labs below reviewed personally     Physical Examination  Review of Systems   Vitals: There were no vitals taken for this visit.  BMI= There is no height or weight on file to calculate BMI.  Wt Readings from Last 3 Encounters:   12/19/24 85.3 kg (188 lb)   01/27/20 90 kg (198 lb 8 oz)   01/22/19 89.4 kg (197 lb 3 oz)       General Appearance:   no distress, normal body habitus   ENT/Mouth: membranes moist, no oral lesions or bleeding gums.      EYES:  no scleral icterus, normal conjunctivae   Neck: no carotid bruits or thyromegaly   Chest/Lungs:   lungs are clear to auscultation, no rales or wheezing, no sternal scar, equal chest wall expansion    Cardiovascular:   Regular. Normal first and second heart sounds with no murmurs, rubs, or gallops; the carotid, radial and posterior tibial pulses are intact, no edema bilaterally    Abdomen:  no organomegaly, masses, bruits, or tenderness; bowel sounds are present   Extremities: no cyanosis or clubbing   Skin: no xanthelasma, warm.    Neurologic: normal  bilateral, no tremors     Psychiatric: alert and oriented x3, calm        Please refer above for cardiac ROS details.        Medical History  Surgical History Family History Social History   No past medical history on file.  Past Surgical History:   Procedure Laterality Date     APPENDECTOMY       SKIN  GRAFT       ZZC APPENDECTOMY      Description: Appendectomy;  Recorded: 07/05/2009;     Family History   Problem Relation Age of Onset     No Known Problems Mother      No Known Problems Father      No Known Problems Daughter      No Known Problems Son         Social History     Socioeconomic History     Marital status: Single     Spouse name: Not on file     Number of children: Not on file     Years of education: Not on file     Highest education level: Not on file   Occupational History     Not on file   Tobacco Use     Smoking status: Former     Smokeless tobacco: Never   Substance and Sexual Activity     Alcohol use: Yes     Alcohol/week: 3.0 standard drinks of alcohol     Comment: Alcoholic Drinks/day: oc     Drug use: No     Sexual activity: Yes     Partners: Female   Other Topics Concern     Not on file   Social History Narrative     Not on file     Social Drivers of Health     Financial Resource Strain: Not on file   Food Insecurity: Not on file   Transportation Needs: Not on file   Physical Activity: Not on file   Stress: Not on file   Social Connections: Not on file   Interpersonal Safety: Not on file   Housing Stability: Not on file           Medications  Allergies   Current Outpatient Medications   Medication Sig Dispense Refill     ALPRAZolam (XANAX) 0.5 MG tablet Take 0.5 mg by mouth daily as needed for anxiety.       hydrocortisone 2.5 % cream Apply topically daily. To lower extremities after showering       metoprolol succinate ER (TOPROL XL) 25 MG 24 hr tablet Take 1 tablet (25 mg) by mouth daily. 30 tablet 0     No Known Allergies       Lab Results    Chemistry/lipid CBC Cardiac Enzymes/BNP/TSH/INR   Recent Labs   Lab Test 01/22/19  1015   CHOL 189   HDL 42      TRIG 146     Recent Labs   Lab Test 01/22/19  1015 05/09/17  1007    106     Recent Labs   Lab Test 12/19/24  0044      POTASSIUM 3.7   CHLORIDE 102   CO2 20*   GLC 95   BUN 20.7*   CR 1.30*   GFRESTIMATED 68   JOSE R 10.1  "    Recent Labs   Lab Test 12/19/24 0044 01/22/19  1015   CR 1.30* 1.16     No results for input(s): \"A1C\" in the last 18610 hours.       Recent Labs   Lab Test 12/19/24 0044   WBC 8.4   HGB 16.9   HCT 47.2   MCV 85        Recent Labs   Lab Test 12/19/24 0044 01/22/19  1015   HGB 16.9 16.6    No results for input(s): \"TROPONINI\" in the last 93360 hours.  No results for input(s): \"BNP\", \"NTBNPI\", \"NTBNP\" in the last 96480 hours.  Recent Labs   Lab Test 12/19/24  0044   TSH 7.54*     No results for input(s): \"INR\" in the last 60458 hours.     Carole Shell MD                                        Thank you for allowing me to participate in the care of your patient.      Sincerely,     Carole Shell MD     [facility]  cc:   Nader Nieto MD  [address] [facility] [address]  [place] [address]      "

## 2025-01-02 NOTE — PROGRESS NOTES
HEART CARE ENCOUNTER CONSULTATON NOTE      [facility]  [phone]      Assessment/Recommendations   Assessment/Plan:    Clay Hamilton is a very pleasant 49 year old male with PMH of SVT who presents today to the EP clinic.    SVT  - symptomatic with palpitations.  Likely AVNRT, though AVRT and AT are possible.  We reviewed SVT mechanisms and reviewed treatment options including electrophysiology study and ablation vs continued medical therapy.  We reviewed the nature of EP studies and ablation for SVT, success rates depending on the specific SVT mechanism, procedural risks (including groin hematoma, tamponade, heart block, stroke) and recovery expectations.  - He is on metoprolol and and would like to avoid long term meds      Time spent: 60 minutes spent on the date of the encounter doing chart review, history and exam, documentation and further activities as noted above.    The longitudinal plan of care for the condition(s) below were addressed during this visit. Due to the added complexity in care, I will continue support in the subsequent management of this condition(s) and with the ongoing continuity of care of this condition(s).          History of Present Illness/Subjective    HPI: Clay Hamilton is a very pleasant 49 year old male with PMH of SVT who presents today to the EP clinic.    Clay describes having symptoms of heart racing and palpitations after running a 5k and some sprints which prompted him to go to the ER in December. His HR was in the 140-160bpm range and it resolved spontaneously. He was started on Metoprolol after that. He has not had any episodes since starting metoprolol. However he would like to avoid long term meds.    He does recall having episodes like this in the past but he attributed to having a panic attack      Recent Echocardiogram Results (personally reviewed):    Interpretation Summary  1. Normal stress echocardiogram without evidence of stress induced  ischemia.  2. Normal resting LV systolic performance with an ejection fraction of 55-60%.  There is normal improvement in left ventricular systolic performance with a  peak ejection fraction of 70-75%.  3. No ECG evidence of ischemia.  4. Patient reported chest discomfort with exercise.  5. Good functional capacity for age.  6. Moderately frequent PVCs and 13 couplets were noted.      Labs below reviewed personally     Physical Examination  Review of Systems   Vitals: There were no vitals taken for this visit.  BMI= There is no height or weight on file to calculate BMI.  Wt Readings from Last 3 Encounters:   12/19/24 85.3 kg (188 lb)   01/27/20 90 kg (198 lb 8 oz)   01/22/19 89.4 kg (197 lb 3 oz)       General Appearance:   no distress, normal body habitus   ENT/Mouth: membranes moist, no oral lesions or bleeding gums.      EYES:  no scleral icterus, normal conjunctivae   Neck: no carotid bruits or thyromegaly   Chest/Lungs:   lungs are clear to auscultation, no rales or wheezing, no sternal scar, equal chest wall expansion    Cardiovascular:   Regular. Normal first and second heart sounds with no murmurs, rubs, or gallops; the carotid, radial and posterior tibial pulses are intact, no edema bilaterally    Abdomen:  no organomegaly, masses, bruits, or tenderness; bowel sounds are present   Extremities: no cyanosis or clubbing   Skin: no xanthelasma, warm.    Neurologic: normal  bilateral, no tremors     Psychiatric: alert and oriented x3, calm        Please refer above for cardiac ROS details.        Medical History  Surgical History Family History Social History   No past medical history on file.  Past Surgical History:   Procedure Laterality Date    APPENDECTOMY      SKIN GRAFT      [facility] APPENDECTOMY      Description: Appendectomy;  Recorded: 07/05/2009;     Family History   Problem Relation Age of Onset    No Known Problems Mother     No Known Problems Father     No Known Problems Daughter     No Known  "Problems Son         Social History     Socioeconomic History    Marital status: Single     Spouse name: Not on file    Number of children: Not on file    Years of education: Not on file    Highest education level: Not on file   Occupational History    Not on file   Tobacco Use    Smoking status: Former    Smokeless tobacco: Never   Substance and Sexual Activity    Alcohol use: Yes     Alcohol/week: 3.0 standard drinks of alcohol     Comment: Alcoholic Drinks/day: oc    Drug use: No    Sexual activity: Yes     Partners: Female   Other Topics Concern    Not on file   Social History Narrative    Not on file     Social Drivers of Health     Financial Resource Strain: Not on file   Food Insecurity: Not on file   Transportation Needs: Not on file   Physical Activity: Not on file   Stress: Not on file   Social Connections: Not on file   Interpersonal Safety: Not on file   Housing Stability: Not on file           Medications  Allergies   Current Outpatient Medications   Medication Sig Dispense Refill    ALPRAZolam (XANAX) 0.5 MG tablet Take 0.5 mg by mouth daily as needed for anxiety.      hydrocortisone 2.5 % cream Apply topically daily. To lower extremities after showering      metoprolol succinate ER (TOPROL XL) 25 MG 24 hr tablet Take 1 tablet (25 mg) by mouth daily. 30 tablet 0     No Known Allergies       Lab Results    Chemistry/lipid CBC Cardiac Enzymes/BNP/TSH/INR   Recent Labs   Lab Test 01/22/19  1015   CHOL 189   HDL 42      TRIG 146     Recent Labs   Lab Test 01/22/19  1015 05/09/17  1007    106     Recent Labs   Lab Test 12/19/24  0044      POTASSIUM 3.7   CHLORIDE 102   CO2 20*   GLC 95   BUN 20.7*   CR 1.30*   GFRESTIMATED 68   JOSE R 10.1     Recent Labs   Lab Test 12/19/24  0044 01/22/19  1015   CR 1.30* 1.16     No results for input(s): \"A1C\" in the last 95459 hours.       Recent Labs   Lab Test 12/19/24  0044   WBC 8.4   HGB 16.9   HCT 47.2   MCV 85        Recent Labs   Lab Test " "12/19/24  0044 01/22/19  1015   HGB 16.9 16.6    No results for input(s): \"TROPONINI\" in the last 67502 hours.  No results for input(s): \"BNP\", \"NTBNPI\", \"NTBNP\" in the last 88005 hours.  Recent Labs   Lab Test 12/19/24  0044   TSH 7.54*     No results for input(s): \"INR\" in the last 78464 hours.     Carole Shell MD                                      "

## 2025-01-06 ENCOUNTER — TELEPHONE (OUTPATIENT)
Dept: CARDIOLOGY | Facility: CLINIC | Age: 50
End: 2025-01-06
Payer: COMMERCIAL

## 2025-01-06 NOTE — TELEPHONE ENCOUNTER
Pt called asking questions regarding upcoming CTCA scheduled this Thursday.   Questions answered and explained non invasive nurse will be reaching out to patient prior to test.-tin

## 2025-01-09 ENCOUNTER — HOSPITAL ENCOUNTER (OUTPATIENT)
Dept: CT IMAGING | Facility: CLINIC | Age: 50
End: 2025-01-09
Attending: INTERNAL MEDICINE
Payer: COMMERCIAL

## 2025-01-09 VITALS — SYSTOLIC BLOOD PRESSURE: 141 MMHG | DIASTOLIC BLOOD PRESSURE: 68 MMHG

## 2025-01-09 DIAGNOSIS — R07.89 CHEST DISCOMFORT: ICD-10-CM

## 2025-01-09 DIAGNOSIS — R00.2 PALPITATIONS: ICD-10-CM

## 2025-01-09 DIAGNOSIS — I47.10 SVT (SUPRAVENTRICULAR TACHYCARDIA): ICD-10-CM

## 2025-01-09 DIAGNOSIS — R06.09 DOE (DYSPNEA ON EXERTION): ICD-10-CM

## 2025-01-09 LAB — BSA FOR ECHO PROCEDURE: 0 M2

## 2025-01-09 PROCEDURE — 75574 CT ANGIO HRT W/3D IMAGE: CPT

## 2025-01-09 PROCEDURE — 250N000011 HC RX IP 250 OP 636: Performed by: INTERNAL MEDICINE

## 2025-01-09 PROCEDURE — 250N000013 HC RX MED GY IP 250 OP 250 PS 637: Performed by: INTERNAL MEDICINE

## 2025-01-09 RX ORDER — IOPAMIDOL 755 MG/ML
100 INJECTION, SOLUTION INTRAVASCULAR ONCE
Status: COMPLETED | OUTPATIENT
Start: 2025-01-09 | End: 2025-01-09

## 2025-01-09 RX ADMIN — IOPAMIDOL 100 ML: 755 INJECTION, SOLUTION INTRAVENOUS at 09:58

## 2025-01-09 RX ADMIN — NITROGLYCERIN 0.4 MG: 0.4 TABLET SUBLINGUAL at 09:55

## 2025-01-13 ENCOUNTER — TELEPHONE (OUTPATIENT)
Dept: CARDIOLOGY | Facility: CLINIC | Age: 50
End: 2025-01-13
Payer: COMMERCIAL

## 2025-01-13 DIAGNOSIS — I47.10 SVT (SUPRAVENTRICULAR TACHYCARDIA): ICD-10-CM

## 2025-01-13 RX ORDER — METOPROLOL SUCCINATE 25 MG/1
25 TABLET, EXTENDED RELEASE ORAL DAILY
Qty: 90 TABLET | Refills: 1 | Status: SHIPPED | OUTPATIENT
Start: 2025-01-13

## 2025-01-13 NOTE — TELEPHONE ENCOUNTER
[facility] Call Center    Phone Message    May a detailed message be left on voicemail: yes     Reason for Call: Medication Refill Request    Has the patient contacted the pharmacy for the refill? Yes   Name of medication being requested: metoprolol succinate ER (TOPROL XL) 25 MG 24 hr tablet     Pharmacy: [facility] [id number] [address]   Date medication is needed: asap    {Pt is leaving Thursday to go out of town for over a month and will need the medication before then.     Action Taken: Other: Cardiology     Travel Screening: Not Applicable     Thank you!  [facility]

## 2025-01-15 ENCOUNTER — HOSPITAL ENCOUNTER (OUTPATIENT)
Dept: ULTRASOUND IMAGING | Facility: HOSPITAL | Age: 50
Discharge: HOME | End: 2025-01-15
Attending: INTERNAL MEDICINE
Payer: COMMERCIAL

## 2025-01-15 DIAGNOSIS — R07.89 CHEST DISCOMFORT: ICD-10-CM

## 2025-01-15 DIAGNOSIS — R06.09 DOE (DYSPNEA ON EXERTION): ICD-10-CM

## 2025-01-15 PROCEDURE — 76705 ECHO EXAM OF ABDOMEN: CPT

## 2025-01-18 ENCOUNTER — HEALTH MAINTENANCE LETTER (OUTPATIENT)
Age: 50
End: 2025-01-18

## 2025-02-13 ENCOUNTER — TELEPHONE (OUTPATIENT)
Dept: CARDIOLOGY | Facility: CLINIC | Age: 50
End: 2025-02-13
Payer: COMMERCIAL

## 2025-02-13 NOTE — TELEPHONE ENCOUNTER
Pre-Procedure Education    Procedure: SVT Abaltion with Dr Shell on 2/20 with arrival time 8:30 am    Orders: Orderset for procedure verified signed/held    COVID: COVID policy- if pt develops COVID like symptoms prior to procedure, he/she would need to complete an at home with a rapid antigen COVID test 1-2 days prior to your procedure date. If COVID + pt is aware the procedure will need to be rescheduled, and to contact CV scheduling as soon as possible    Pre-Op H&P: Will need to verify that pt has pre-op scheduled with PMD, as apt/record of pre-op not listed in EPIC- Request will be/was placed to obtain records, and scan into Media upon completion    Education:   Pre-Procedure Instruction: NPO after midnight pre procedure, Defined NPO with pt, Remove all jewelry and leave all valuables at home, Shower prior to arrival, Sedation plan/orders, Transportation requirements and arrangements post procedure, Post-procedure follow up process, Post-procedure restrictions/expectations, and Pre-procedure letter sent- letter tab  Risks:  Cardiac Ablation  <1% Hypotension, Hemorrhage, Thrombophlebitis, Systemic or pulmonic emboli, Cardiac perforation (tamponade), Infection, Pneumothorax, Arrhythmias, Proarrhythmic effects of drugs, Radiation exposure, Catheter entrapment  <1 % Vascular injury including perforation of vein, artery or heart  1-2% Complete heart block (for AVNRT or septal accessory pathway)  <0.5% CVA or MI, 1% CVA if Left sided ablation  <0.1% death  If external defibrillation or CV is needed, 25% risk for superficial burn  1-2% Tamponade and Aortic puncture with left sided transeptal approach  Risks associated with general anesthesia will be addressed by the Anesthesiology Department      Medication:   Instructions regarding anticoagulants: Pt not on AC- N/A  Instructions regarding antiarrhythmic medication:  Metoprolol ; hold 5 days prior to procedure  Instructions given to pt regarding diuretics  medication: None  Instructions given to pt regarding DM/GLP-1 medication:   DM- None  GLP-1- None  Instructions for medication, other than anticoagulants and antiarrhythmics listed above, given to pt: Take all medication AM of procedure with small sips of water     Important patient information for staff: None    2/13/2025 7:49 AM  Bozena Mcintosh RN

## 2025-02-13 NOTE — TELEPHONE ENCOUNTER
Return call from patient, reviewed the below information and he states understanding.    He does not have an H&P scheduled yet, he does see an [facility] provider.  He will call to today to schedule, explained this does need to be completed prior to procedure.      Will postpone note to Monday and check that he is scheduled.

## 2025-02-13 NOTE — TELEPHONE ENCOUNTER
1st Attempt to contact pt, voicemail message was left with contact information and instructing pt to call back.  2/13/2025 8:41 AM  Bozena Mcintosh RN

## 2025-02-17 ENCOUNTER — TELEPHONE (OUTPATIENT)
Dept: CARDIOLOGY | Facility: CLINIC | Age: 50
End: 2025-02-17
Payer: COMMERCIAL

## 2025-02-17 NOTE — TELEPHONE ENCOUNTER
Phone call from patient, he is scheduled for an SVT ablation with Dr. Shell on Thursday 2-20-25.  He states he is currently recovering from a sinus infection, he is on antibiotics for 10 days.  He is feeling a bit better but would like to reschedule his ablation at this time.  Will ask our scheduling team to reach out to reschedule him.